# Patient Record
Sex: FEMALE | Employment: FULL TIME | URBAN - METROPOLITAN AREA
[De-identification: names, ages, dates, MRNs, and addresses within clinical notes are randomized per-mention and may not be internally consistent; named-entity substitution may affect disease eponyms.]

---

## 2020-08-06 ENCOUNTER — VIRTUAL VISIT (OUTPATIENT)
Dept: FAMILY MEDICINE CLINIC | Age: 45
End: 2020-08-06

## 2020-08-06 DIAGNOSIS — Z91.199 NO-SHOW FOR APPOINTMENT: Primary | ICD-10-CM

## 2024-06-25 ENCOUNTER — OFFICE VISIT (OUTPATIENT)
Age: 49
End: 2024-06-25
Payer: COMMERCIAL

## 2024-06-25 VITALS — WEIGHT: 209 LBS | HEIGHT: 67 IN | BODY MASS INDEX: 32.8 KG/M2

## 2024-06-25 DIAGNOSIS — M67.922 TENDINOPATHY OF LEFT BICEPS TENDON: ICD-10-CM

## 2024-06-25 DIAGNOSIS — M25.512 ACUTE PAIN OF LEFT SHOULDER: Primary | ICD-10-CM

## 2024-06-25 PROCEDURE — 99203 OFFICE O/P NEW LOW 30 MIN: CPT | Performed by: ORTHOPAEDIC SURGERY

## 2024-06-25 PROCEDURE — 73030 X-RAY EXAM OF SHOULDER: CPT | Performed by: ORTHOPAEDIC SURGERY

## 2024-06-25 RX ORDER — PREDNISONE 10 MG/1
TABLET ORAL
COMMUNITY
Start: 2024-06-11

## 2024-06-25 RX ORDER — MELOXICAM 15 MG/1
15 TABLET ORAL DAILY
Qty: 30 TABLET | Refills: 3 | Status: SHIPPED | OUTPATIENT
Start: 2024-06-25

## 2024-06-25 RX ORDER — CHLORTHALIDONE 25 MG/1
TABLET ORAL
COMMUNITY
Start: 2024-04-03

## 2024-06-25 RX ORDER — LEVOTHYROXINE SODIUM 112 UG/1
TABLET ORAL
COMMUNITY
Start: 2024-04-01

## 2024-06-25 NOTE — PROGRESS NOTES
urine, Edema.   Pertinent positive as above in HPI. All others were negative    PHYSICAL EXAM:   Ht 1.702 m (5' 7\")   Wt 94.8 kg (209 lb)   BMI 32.73 kg/m²   The patient is a well-developed, well-nourished female   in no acute distress.  The patient is alert and oriented times three.  The patient is alert and oriented times three. Mood and affect are normal.  LYMPHATIC: lymph nodes are not enlarged and are within normal limits  SKIN: normal in color and non tender to palpation. There are no bruises or abrasions noted.   NEUROLOGICAL: Motor sensory exam is within normal limits. Reflexes are equal bilaterally. There is normal sensation to pinprick and light touch  MUSCULOSKELETAL:  Examination Left shoulder Right shoulder   Skin Intact Intact   AC joint tenderness - -   Biceps tenderness + -   Forward flexion/Elevation  180   Active abduction  160   Glenohumeral abduction 90 90   External rotation ROM 90 90   Internal rotation ROM 45 70   Apprehension - -   Arnoldo’s Relocation - -   Jerk - -   Load and Shift - -   O’briens - -   Speeds - -   Impingement sign + -   Supraspinatus/Empty Can -, 5/5 -, 5/5   External Rotation Strength -, 5/5 -, 5/5   Lift Off/Belly Press -, 5/5 -, 5/5   Neurovascular Intact Intact         PROCEDURE: None    IMAGING: XR of the right shoulder with 3 views obtained in office dated 6/25/2024 read and reviewed by myself reveal: no acute abnormalities      IMPRESSION:      ICD-10-CM    1. Acute pain of left shoulder  M25.512 [[id number]] Shoulder 2V or more      2. Tendinopathy of left biceps tendon  M67.922            PLAN:   1. Patient presents with left shoulder pain likely due to bicep tendinopathy. Discussed treatment options with the patient including medications, left shoulder cortisone injection, physician directed exercise program versus formal physical therapy, and/or modifications of activities. Advised to avoid overhead and incline presses, bicep curls, and dips. In the next

## 2025-05-09 SDOH — HEALTH STABILITY: PHYSICAL HEALTH: ON AVERAGE, HOW MANY MINUTES DO YOU ENGAGE IN EXERCISE AT THIS LEVEL?: 60 MIN

## 2025-05-09 SDOH — HEALTH STABILITY: PHYSICAL HEALTH: ON AVERAGE, HOW MANY DAYS PER WEEK DO YOU ENGAGE IN MODERATE TO STRENUOUS EXERCISE (LIKE A BRISK WALK)?: 5 DAYS

## 2025-05-12 ENCOUNTER — OFFICE VISIT (OUTPATIENT)
Facility: CLINIC | Age: 50
End: 2025-05-12
Payer: COMMERCIAL

## 2025-05-12 VITALS
HEART RATE: 62 BPM | RESPIRATION RATE: 12 BRPM | TEMPERATURE: 98 F | WEIGHT: 213 LBS | HEIGHT: 67 IN | DIASTOLIC BLOOD PRESSURE: 88 MMHG | OXYGEN SATURATION: 98 % | BODY MASS INDEX: 33.43 KG/M2 | SYSTOLIC BLOOD PRESSURE: 134 MMHG

## 2025-05-12 DIAGNOSIS — Z12.31 ENCOUNTER FOR SCREENING MAMMOGRAM FOR MALIGNANT NEOPLASM OF BREAST: ICD-10-CM

## 2025-05-12 DIAGNOSIS — Z12.11 ENCOUNTER FOR SCREENING FOR MALIGNANT NEOPLASM OF COLON: ICD-10-CM

## 2025-05-12 DIAGNOSIS — I10 PRIMARY HYPERTENSION: Primary | ICD-10-CM

## 2025-05-12 DIAGNOSIS — E03.9 HYPOTHYROIDISM, UNSPECIFIED TYPE: ICD-10-CM

## 2025-05-12 PROCEDURE — 99214 OFFICE O/P EST MOD 30 MIN: CPT | Performed by: STUDENT IN AN ORGANIZED HEALTH CARE EDUCATION/TRAINING PROGRAM

## 2025-05-12 PROCEDURE — 3075F SYST BP GE 130 - 139MM HG: CPT | Performed by: STUDENT IN AN ORGANIZED HEALTH CARE EDUCATION/TRAINING PROGRAM

## 2025-05-12 PROCEDURE — 3079F DIAST BP 80-89 MM HG: CPT | Performed by: STUDENT IN AN ORGANIZED HEALTH CARE EDUCATION/TRAINING PROGRAM

## 2025-05-12 RX ORDER — CHLORTHALIDONE 25 MG/1
25 TABLET ORAL DAILY
Qty: 30 TABLET | Refills: 1
Start: 2025-05-12

## 2025-05-12 RX ORDER — LEVOTHYROXINE SODIUM 112 UG/1
112 TABLET ORAL DAILY
Qty: 30 TABLET | Refills: 1
Start: 2025-05-12

## 2025-05-12 ASSESSMENT — PATIENT HEALTH QUESTIONNAIRE - PHQ9
SUM OF ALL RESPONSES TO PHQ QUESTIONS 1-9: 0
2. FEELING DOWN, DEPRESSED OR HOPELESS: NOT AT ALL
SUM OF ALL RESPONSES TO PHQ QUESTIONS 1-9: 0
1. LITTLE INTEREST OR PLEASURE IN DOING THINGS: NOT AT ALL
SUM OF ALL RESPONSES TO PHQ QUESTIONS 1-9: 0
SUM OF ALL RESPONSES TO PHQ QUESTIONS 1-9: 0

## 2025-05-12 NOTE — PROGRESS NOTES
Have you been to the ER, urgent care clinic since your last visit?  Hospitalized since your last visit?   NO    Have you seen or consulted any other health care providers outside our system since your last visit?   NO    Have you had a mammogram?”   YES - Where: 2023    Nurse/CMA to request most recent records if not in the chart    Date of last Mammogram: 6/24/2022      “Have you had a pap smear?”    NO; patient is due    No cervical cancer screening on file       “Have you had a colorectal cancer screening such as a colonoscopy/FIT/Cologuard?    NO; patient due for screening due to family history    No colonoscopy on file  No cologuard on file  No FIT/FOBT on file   No flexible sigmoidoscopy on file

## 2025-05-12 NOTE — PROGRESS NOTES
Subjective:  Kalpana is a 49 y.o. y.o. female who presents for following.       Pt feels like she is going through perimenopause  Pt was 194 pounds but now 213 pounds   Pt states eating is not best,   Pt having hotflashes mild   Pt states having regular periods   Pt states that she usually sleeps 9 pm - 5 am , good quality sleep       ROS:   General: no fever  CV:  no chest pain  Resp: no shortness of breath  AB: no  abdominal pain     Past Medical History:   Diagnosis Date    HTN (hypertension)     Hyperthyroidism 2018    Hypothyroidism     Psychiatric disorder       Past Surgical History:   Procedure Laterality Date    APPENDECTOMY       SECTION      x 2      Social History     Socioeconomic History    Marital status: Unknown     Spouse name: None    Number of children: None    Years of education: None    Highest education level: None   Tobacco Use    Smoking status: Never    Smokeless tobacco: Never   Vaping Use    Vaping status: Never Used   Substance and Sexual Activity    Alcohol use: No    Drug use: Never    Sexual activity: Yes     Partners: Male     Social Drivers of Health     Physical Activity: Sufficiently Active (2025)    Exercise Vital Sign     Days of Exercise per Week: 5 days     Minutes of Exercise per Session: 60 min      Current Outpatient Medications   Medication Sig Dispense Refill    chlorthalidone (HYGROTON) 25 MG tablet Take 1 tablet by mouth daily 30 tablet 1    levothyroxine (SYNTHROID) 112 MCG tablet Take 1 tablet by mouth Daily 30 tablet 1     No current facility-administered medications for this visit.      Allergies   Allergen Reactions    Amlodipine Swelling      Family History   Problem Relation Age of Onset    Cancer Mother         Lung Cancer    Cancer Brother         Colon            OBJECTIVE  Vitals:    25 1313   BP: 134/88   BP Site: Left Upper Arm   Patient Position: Sitting   BP Cuff Size: Large Adult   Pulse: 62   Resp: 12   Temp: 98 °F (36.7 °C)

## 2025-05-23 ENCOUNTER — HOSPITAL ENCOUNTER (OUTPATIENT)
Facility: HOSPITAL | Age: 50
Setting detail: SPECIMEN
Discharge: HOME | End: 2025-05-26
Payer: COMMERCIAL

## 2025-05-23 DIAGNOSIS — E03.9 HYPOTHYROIDISM, UNSPECIFIED TYPE: ICD-10-CM

## 2025-05-23 DIAGNOSIS — I10 PRIMARY HYPERTENSION: ICD-10-CM

## 2025-05-23 LAB
ALBUMIN SERPL-MCNC: 3.7 G/DL (ref 3.4–5)
ALBUMIN/GLOB SERPL: 1 (ref 0.8–1.7)
ALP SERPL-CCNC: 70 U/L (ref 45–117)
ALT SERPL-CCNC: 29 U/L (ref 10–35)
ANION GAP SERPL CALC-SCNC: 12 MMOL/L (ref 3–18)
AST SERPL-CCNC: 27 U/L (ref 10–38)
BASOPHILS # BLD: 0.05 K/UL (ref 0–0.1)
BASOPHILS NFR BLD: 0.8 % (ref 0–2)
BILIRUB SERPL-MCNC: 0.8 MG/DL (ref 0.2–1)
BUN SERPL-MCNC: 9 MG/DL (ref 6–23)
BUN/CREAT SERPL: 12 (ref 12–20)
CALCIUM SERPL-MCNC: 9.7 MG/DL (ref 8.5–10.1)
CHLORIDE SERPL-SCNC: 101 MMOL/L (ref 98–107)
CHOLEST SERPL-MCNC: 238 MG/DL
CO2 SERPL-SCNC: 26 MMOL/L (ref 21–32)
CREAT SERPL-MCNC: 0.79 MG/DL (ref 0.6–1.3)
DIFFERENTIAL METHOD BLD: ABNORMAL
EOSINOPHIL # BLD: 0.21 K/UL (ref 0–0.4)
EOSINOPHIL NFR BLD: 3.4 % (ref 0–5)
ERYTHROCYTE [DISTWIDTH] IN BLOOD BY AUTOMATED COUNT: 13.1 % (ref 11.6–14.5)
GLOBULIN SER CALC-MCNC: 3.5 G/DL (ref 2–4)
GLUCOSE SERPL-MCNC: 121 MG/DL (ref 74–108)
HCT VFR BLD AUTO: 39.7 % (ref 35–45)
HDLC SERPL-MCNC: 67 MG/DL (ref 40–60)
HDLC SERPL: 3.6 (ref 0–5)
HGB BLD-MCNC: 13 G/DL (ref 12–16)
IMM GRANULOCYTES # BLD AUTO: 0.02 K/UL (ref 0–0.04)
IMM GRANULOCYTES NFR BLD AUTO: 0.3 % (ref 0–0.5)
LDLC SERPL CALC-MCNC: 158 MG/DL (ref 0–100)
LYMPHOCYTES # BLD: 2.27 K/UL (ref 0.9–3.6)
LYMPHOCYTES NFR BLD: 36.3 % (ref 21–52)
MCH RBC QN AUTO: 29.7 PG (ref 24–34)
MCHC RBC AUTO-ENTMCNC: 32.7 G/DL (ref 31–37)
MCV RBC AUTO: 90.6 FL (ref 78–100)
MONOCYTES # BLD: 0.51 K/UL (ref 0.05–1.2)
MONOCYTES NFR BLD: 8.2 % (ref 3–10)
NEUTS SEG # BLD: 3.19 K/UL (ref 1.8–8)
NEUTS SEG NFR BLD: 51 % (ref 40–73)
NRBC # BLD: 0 K/UL (ref 0–0.01)
NRBC BLD-RTO: 0 PER 100 WBC
PLATELET # BLD AUTO: 245 K/UL (ref 135–420)
PMV BLD AUTO: 12.7 FL (ref 9.2–11.8)
POTASSIUM SERPL-SCNC: 3.5 MMOL/L (ref 3.5–5.5)
PROT SERPL-MCNC: 7.2 G/DL (ref 6.4–8.2)
RBC # BLD AUTO: 4.38 M/UL (ref 4.2–5.3)
SODIUM SERPL-SCNC: 139 MMOL/L (ref 136–145)
TRIGL SERPL-MCNC: 67 MG/DL (ref 0–150)
TSH W FREE THYROID IF ABNORMAL: 2.98 UIU/ML (ref 0.27–4.2)
VLDLC SERPL CALC-MCNC: 13 MG/DL
WBC # BLD AUTO: 6.3 K/UL (ref 4.6–13.2)

## 2025-05-23 PROCEDURE — 80061 LIPID PANEL: CPT

## 2025-05-23 PROCEDURE — 36415 COLL VENOUS BLD VENIPUNCTURE: CPT

## 2025-05-23 PROCEDURE — 84443 ASSAY THYROID STIM HORMONE: CPT

## 2025-05-23 PROCEDURE — 80053 COMPREHEN METABOLIC PANEL: CPT

## 2025-05-23 PROCEDURE — 85025 COMPLETE CBC W/AUTO DIFF WBC: CPT

## 2025-05-27 ENCOUNTER — RESULTS FOLLOW-UP (OUTPATIENT)
Facility: CLINIC | Age: 50
End: 2025-05-27

## 2025-06-06 ENCOUNTER — OFFICE VISIT (OUTPATIENT)
Facility: CLINIC | Age: 50
End: 2025-06-06
Payer: COMMERCIAL

## 2025-06-06 VITALS
HEART RATE: 84 BPM | SYSTOLIC BLOOD PRESSURE: 128 MMHG | OXYGEN SATURATION: 93 % | BODY MASS INDEX: 33.65 KG/M2 | HEIGHT: 67 IN | TEMPERATURE: 98.1 F | DIASTOLIC BLOOD PRESSURE: 80 MMHG | WEIGHT: 214.4 LBS | RESPIRATION RATE: 12 BRPM

## 2025-06-06 DIAGNOSIS — Z00.00 WELLNESS EXAMINATION: Primary | ICD-10-CM

## 2025-06-06 DIAGNOSIS — Z12.11 ENCOUNTER FOR SCREENING FOR MALIGNANT NEOPLASM OF COLON: ICD-10-CM

## 2025-06-06 PROCEDURE — 99396 PREV VISIT EST AGE 40-64: CPT | Performed by: STUDENT IN AN ORGANIZED HEALTH CARE EDUCATION/TRAINING PROGRAM

## 2025-06-06 NOTE — PROGRESS NOTES
Have you been to the ER, urgent care clinic since your last visit?  Hospitalized since your last visit?   NO    Have you seen or consulted any other health care providers outside our system since your last visit?   NO    Have you had a mammogram?”   NO; patient is due    Date of last Mammogram: 6/24/2022      “Have you had a pap smear?”    NO; was here but patient started cycle    No cervical cancer screening on file       “Have you had a colorectal cancer screening such as a colonoscopy/FIT/Cologuard?    NO    No colonoscopy on file  No cologuard on file  No FIT/FOBT on file   No flexible sigmoidoscopy on file          
       Lung Cancer    Cancer Brother         Colon            OBJECTIVE  Vitals:    06/06/25 1408   BP: 128/80   BP Site: Right Upper Arm   Patient Position: Sitting   BP Cuff Size: Large Adult   Pulse: 84   Resp: 12   Temp: 98.1 °F (36.7 °C)   TempSrc: Temporal   SpO2: 93%   Weight: 97.3 kg (214 lb 6.4 oz)   Height: 1.702 m (5' 7\")     Body mass index is 33.58 kg/m².   Wt Readings from Last 3 Encounters:   06/06/25 97.3 kg (214 lb 6.4 oz)   05/12/25 96.6 kg (213 lb)   06/25/24 94.8 kg (209 lb)     General:  alert, cooperative, well appearing, in no apparent distress.  Eyes:   The conjunctiva is clear and noninjected.  There is no sclera icterus or conjunctival pallor.  CV:  The heart sounds are regular in rate and rhythm.  There is a normal S1 and S2.  There or no murmurs.  Lungs:  No increased work of breathing, Lung sounds are clear and equal to auscultation throughout all lung fields without wheezing, rales, or rhonchi.  GI:  The abdomen is soft with no tenderness.  Bowel sounds are present and normal. There is no rebound or guarding.    Skin: no rashes, no jaundice  Psych: normal affect.           ASSESSMENT / PLAN  Kalpana was seen today for annual exam.    Diagnoses and all orders for this visit:    Wellness examination  -     Hemoglobin A1C; Future    Encounter for screening for malignant neoplasm of colon  -     External Referral To Gastroenterology                  This note was dictated utilizing voice recognition software which may lead to typographical errors.  I apologize in advance if the situation occurs.  If questions arise please do not hesitate to contact me or call our department.    An electronic signature was used to authenticate this note.    --Mariama Dennis MD

## 2025-07-08 ENCOUNTER — TELEPHONE (OUTPATIENT)
Facility: CLINIC | Age: 50
End: 2025-07-08

## 2025-07-08 NOTE — TELEPHONE ENCOUNTER
Left patient vm to contact the office to reschedule appt for 7/9/25. Patient needs to be in 30 min slot.

## 2025-07-17 ENCOUNTER — TELEPHONE (OUTPATIENT)
Facility: CLINIC | Age: 50
End: 2025-07-17

## 2025-07-17 NOTE — TELEPHONE ENCOUNTER
Attempted call to patient regarding appt for tomorrow, call was picked up but patient did not answer my greeting, sounded as if on a business call about retaliation but did respond to my attempt to speak with her, disconnected call.     Appt needs to be rescheduled to an earlier appt for 30min, [facility]

## 2025-07-29 ENCOUNTER — HOSPITAL ENCOUNTER (OUTPATIENT)
Facility: HOSPITAL | Age: 50
Discharge: HOME | End: 2025-08-01
Attending: STUDENT IN AN ORGANIZED HEALTH CARE EDUCATION/TRAINING PROGRAM
Payer: COMMERCIAL

## 2025-07-29 VITALS — WEIGHT: 208 LBS | HEIGHT: 67 IN | BODY MASS INDEX: 32.65 KG/M2

## 2025-07-29 DIAGNOSIS — Z12.31 ENCOUNTER FOR SCREENING MAMMOGRAM FOR MALIGNANT NEOPLASM OF BREAST: ICD-10-CM

## 2025-07-29 PROCEDURE — 77063 BREAST TOMOSYNTHESIS BI: CPT
